# Patient Record
Sex: FEMALE | Race: BLACK OR AFRICAN AMERICAN | HISPANIC OR LATINO | Employment: UNEMPLOYED | URBAN - METROPOLITAN AREA
[De-identification: names, ages, dates, MRNs, and addresses within clinical notes are randomized per-mention and may not be internally consistent; named-entity substitution may affect disease eponyms.]

---

## 2024-11-18 ENCOUNTER — HOSPITAL ENCOUNTER (EMERGENCY)
Facility: HOSPITAL | Age: 39
Discharge: HOME | End: 2024-11-18
Attending: EMERGENCY MEDICINE
Payer: MEDICAID

## 2024-11-18 VITALS
WEIGHT: 128 LBS | OXYGEN SATURATION: 99 % | DIASTOLIC BLOOD PRESSURE: 76 MMHG | HEIGHT: 60 IN | HEART RATE: 100 BPM | BODY MASS INDEX: 25.13 KG/M2 | TEMPERATURE: 98 F | SYSTOLIC BLOOD PRESSURE: 146 MMHG | RESPIRATION RATE: 16 BRPM

## 2024-11-18 DIAGNOSIS — R07.9 CHEST PAIN: ICD-10-CM

## 2024-11-18 DIAGNOSIS — R20.0 LEFT ARM NUMBNESS: Primary | ICD-10-CM

## 2024-11-18 LAB
ALBUMIN SERPL BCP-MCNC: 4.1 G/DL (ref 3.5–5.2)
ALP SERPL-CCNC: 26 U/L (ref 40–150)
ALT SERPL W/O P-5'-P-CCNC: 10 U/L (ref 10–44)
ANION GAP SERPL CALC-SCNC: 8 MMOL/L (ref 8–16)
AST SERPL-CCNC: 27 U/L (ref 10–40)
B-HCG UR QL: NEGATIVE
BASOPHILS # BLD AUTO: 0.05 K/UL (ref 0–0.2)
BASOPHILS NFR BLD: 0.6 % (ref 0–1.9)
BILIRUB SERPL-MCNC: 0.8 MG/DL (ref 0.1–1)
BNP SERPL-MCNC: <10 PG/ML (ref 0–99)
BUN SERPL-MCNC: 14 MG/DL (ref 6–20)
CALCIUM SERPL-MCNC: 9 MG/DL (ref 8.7–10.5)
CHLORIDE SERPL-SCNC: 105 MMOL/L (ref 95–110)
CO2 SERPL-SCNC: 25 MMOL/L (ref 23–29)
CREAT SERPL-MCNC: 0.7 MG/DL (ref 0.5–1.4)
CTP QC/QA: YES
DIFFERENTIAL METHOD BLD: ABNORMAL
EOSINOPHIL # BLD AUTO: 0 K/UL (ref 0–0.5)
EOSINOPHIL NFR BLD: 0.5 % (ref 0–8)
ERYTHROCYTE [DISTWIDTH] IN BLOOD BY AUTOMATED COUNT: 15.2 % (ref 11.5–14.5)
EST. GFR  (NO RACE VARIABLE): >60 ML/MIN/1.73 M^2
GLUCOSE SERPL-MCNC: 107 MG/DL (ref 70–110)
HCT VFR BLD AUTO: 35.2 % (ref 37–48.5)
HGB BLD-MCNC: 11.1 G/DL (ref 12–16)
IMM GRANULOCYTES # BLD AUTO: 0.03 K/UL (ref 0–0.04)
IMM GRANULOCYTES NFR BLD AUTO: 0.3 % (ref 0–0.5)
LYMPHOCYTES # BLD AUTO: 3.3 K/UL (ref 1–4.8)
LYMPHOCYTES NFR BLD: 37.1 % (ref 18–48)
MCH RBC QN AUTO: 25.6 PG (ref 27–31)
MCHC RBC AUTO-ENTMCNC: 31.5 G/DL (ref 32–36)
MCV RBC AUTO: 81 FL (ref 82–98)
MONOCYTES # BLD AUTO: 0.5 K/UL (ref 0.3–1)
MONOCYTES NFR BLD: 5.7 % (ref 4–15)
NEUTROPHILS # BLD AUTO: 4.9 K/UL (ref 1.8–7.7)
NEUTROPHILS NFR BLD: 55.8 % (ref 38–73)
NRBC BLD-RTO: 0 /100 WBC
PLATELET # BLD AUTO: 349 K/UL (ref 150–450)
PMV BLD AUTO: 9.3 FL (ref 9.2–12.9)
POTASSIUM SERPL-SCNC: 3.3 MMOL/L (ref 3.5–5.1)
PROT SERPL-MCNC: 7.9 G/DL (ref 6–8.4)
RBC # BLD AUTO: 4.34 M/UL (ref 4–5.4)
SODIUM SERPL-SCNC: 138 MMOL/L (ref 136–145)
TROPONIN I SERPL DL<=0.01 NG/ML-MCNC: <0.006 NG/ML (ref 0–0.03)
WBC # BLD AUTO: 8.82 K/UL (ref 3.9–12.7)

## 2024-11-18 PROCEDURE — 85025 COMPLETE CBC W/AUTO DIFF WBC: CPT

## 2024-11-18 PROCEDURE — 84484 ASSAY OF TROPONIN QUANT: CPT

## 2024-11-18 PROCEDURE — 93010 ELECTROCARDIOGRAM REPORT: CPT | Mod: ,,, | Performed by: INTERNAL MEDICINE

## 2024-11-18 PROCEDURE — 99285 EMERGENCY DEPT VISIT HI MDM: CPT | Mod: 25

## 2024-11-18 PROCEDURE — 83880 ASSAY OF NATRIURETIC PEPTIDE: CPT

## 2024-11-18 PROCEDURE — 81025 URINE PREGNANCY TEST: CPT | Performed by: NURSE PRACTITIONER

## 2024-11-18 PROCEDURE — 93005 ELECTROCARDIOGRAM TRACING: CPT

## 2024-11-18 PROCEDURE — 25000003 PHARM REV CODE 250: Performed by: NURSE PRACTITIONER

## 2024-11-18 PROCEDURE — 80053 COMPREHEN METABOLIC PANEL: CPT

## 2024-11-18 RX ORDER — NAPROXEN 500 MG/1
500 TABLET ORAL 2 TIMES DAILY WITH MEALS
Qty: 14 TABLET | Refills: 0 | Status: SHIPPED | OUTPATIENT
Start: 2024-11-18

## 2024-11-18 RX ORDER — POTASSIUM CHLORIDE 20 MEQ/1
40 TABLET, EXTENDED RELEASE ORAL
Status: COMPLETED | OUTPATIENT
Start: 2024-11-18 | End: 2024-11-18

## 2024-11-18 RX ADMIN — POTASSIUM CHLORIDE 40 MEQ: 1500 TABLET, EXTENDED RELEASE ORAL at 05:11

## 2024-11-18 NOTE — ED PROVIDER NOTES
Encounter Date: 2024       History     Chief Complaint   Patient presents with    Numbness     Pt reports onset of left atraumatic neck pain x4 days that radiated down to her left shoulder. Pt reports that this morning she began feeling numbness to the distal portion of all her the fingers of her left hand. Reports only numbness no tingling. Paramjit PARTIDA at triage, neuro assessment performed at triage, no neuro deficits noted. Pt also reports intermittent non-radiating chest pain that began approximately 4 days ago with onset of neck pain. Denies dizziness, sob, diaphoresis.      Chief complaint:  Left hand numbness     History of present illness: Patient is a 38-year-old female who reports for proximally 3 months she has had intermittent chest pain that feels like pressure over the left side of her chest.  She reports that usually it comes and goes over a period of 2 days then resolves then recurs.  She endorses the reason she came to the ER today is because she has numbness and tingling in her left hand that is new feature starting yesterday.  Denies nausea and vomiting but endorses shortness of breath.    The history is provided by the patient. A  was used ([id number]).     Review of patient's allergies indicates:  No Known Allergies  History reviewed. No pertinent past medical history.  Past Surgical History:   Procedure Laterality Date     SECTION       Family History   Problem Relation Name Age of Onset    No Known Problems Mother      No Known Problems Father       Social History     Tobacco Use    Smoking status: Never   Substance Use Topics    Alcohol use: Yes    Drug use: No     Review of Systems   Respiratory:  Positive for shortness of breath.    Cardiovascular:  Positive for chest pain.       Physical Exam     Initial Vitals [24 1525]   BP Pulse Resp Temp SpO2   (!) 146/76 100 16 98.1 °F (36.7 °C) 99 %      MAP       --         Physical Exam    Nursing note and vitals  reviewed.  Constitutional: She appears well-developed and well-nourished.   HENT:   Head: Normocephalic and atraumatic.   Right Ear: External ear normal.   Left Ear: External ear normal.   Nose: Nose normal.   Eyes: Conjunctivae and EOM are normal. Pupils are equal, round, and reactive to light. Right eye exhibits no discharge. Left eye exhibits no discharge.   Neck:   Normal range of motion.  Cardiovascular:  Regular rhythm, S1 normal, S2 normal and normal heart sounds.     Exam reveals no gallop.       No murmur heard.  Pulmonary/Chest: Effort normal and breath sounds normal. No respiratory distress. She has no decreased breath sounds. She has no wheezes. She has no rhonchi. She has no rales.   Abdominal: She exhibits no distension.   Musculoskeletal:         General: Normal range of motion.      Cervical back: Normal range of motion.     Neurological: She is alert and oriented to person, place, and time. She has normal strength. No cranial nerve deficit or sensory deficit. She exhibits normal muscle tone. She displays a negative Romberg sign. Coordination and gait normal. GCS eye subscore is 4. GCS verbal subscore is 5. GCS motor subscore is 6.   Equal  strength bilaterally, equal bicep flexion and tricep extension strength, leg extension and flexion strength appropriate and equal, foot plantar- and dorsi-flexion equal and appropriate   Skin: Skin is dry. Capillary refill takes less than 2 seconds.         ED Course   Procedures  Labs Reviewed   CBC W/ AUTO DIFFERENTIAL - Abnormal       Result Value    WBC 8.82      RBC 4.34      Hemoglobin 11.1 (*)     Hematocrit 35.2 (*)     MCV 81 (*)     MCH 25.6 (*)     MCHC 31.5 (*)     RDW 15.2 (*)     Platelets 349      MPV 9.3      Immature Granulocytes 0.3      Gran # (ANC) 4.9      Immature Grans (Abs) 0.03      Lymph # 3.3      Mono # 0.5      Eos # 0.0      Baso # 0.05      nRBC 0      Gran % 55.8      Lymph % 37.1      Mono % 5.7      Eosinophil % 0.5       Basophil % 0.6      Differential Method Automated     COMPREHENSIVE METABOLIC PANEL - Abnormal    Sodium 138      Potassium 3.3 (*)     Chloride 105      CO2 25      Glucose 107      BUN 14      Creatinine 0.7      Calcium 9.0      Total Protein 7.9      Albumin 4.1      Total Bilirubin 0.8      Alkaline Phosphatase 26 (*)     AST 27      ALT 10      eGFR >60      Anion Gap 8     TROPONIN I    Troponin I <0.006     B-TYPE NATRIURETIC PEPTIDE    BNP <10     POCT URINE PREGNANCY    POC Preg Test, Ur Negative       Acceptable Yes          ECG Results              EKG 12-lead (Preliminary result)  Result time 11/18/24 16:33:57      Wet Read by Billy Scott DNP (11/18/24 16:33:57, [facility] Dept, Emergency Medicine)    Independent EKG interpretation of the study dated November 18, 2024 at 15:33 normal sinus rhythm no ectopy no ST elevation MI ventricular rate of 86 QTC interval 399 milliseconds.  Low voltage study.                                  Imaging Results              X-Ray Chest PA And Lateral (Final result)  Result time 11/18/24 17:01:34      Final result by Kobe Betancur MD (11/18/24 17:01:34)                   Impression:      No acute cardiopulmonary process identified.      Electronically signed by: Kobe Betancur MD  Date:    11/18/2024  Time:    17:01               Narrative:    EXAMINATION:  XR CHEST PA AND LATERAL    CLINICAL HISTORY:  Chest Pain;    TECHNIQUE:  PA and lateral views of the chest were performed.    COMPARISON:  None    FINDINGS:  Cardiac silhouette is normal in size.  Lungs are symmetrically expanded.  No evidence of focal consolidative process, pneumothorax, or significant pleural effusion.  No acute osseous abnormality identified.                                       Medications   potassium chloride SA CR tablet 40 mEq (40 mEq Oral Given 11/18/24 1725)     Medical Decision Making  Patient is a 38-year-old female who reports for proximally 3  months she has had intermittent chest pain that feels like pressure over the left side of her chest.  She reports that usually it comes and goes over a period of 2 days then resolves then recurs.  She endorses the reason she came to the ER today is because she has numbness and tingling in her left hand that is new feature starting yesterday.  Denies nausea and vomiting but endorses shortness of breath.    On physical exam the patient is afebrile nontoxic in no apparent distress breath sounds are clear to auscultation heart sounds without abnormality skin warm dry and intact.  Patient has sensation in the left hand as well as the right normal neurologic assessment of the extremities is noted.      Differential diagnosis includes hyperventilation syndrome, influenza COVID-19 paresthesias cervical radiculopathy    Problems Addressed:  Chest pain: acute illness or injury     Details: Given the timing of the chest pain I do not believe it is cardiac in nature nor an emergency.  For that reason patient is discharged to follow up with Cardiology  Left arm numbness: acute illness or injury     Details: Complete sensation noted on exam.  Full strength in both hands likely cervical radiculopathy.  Naprosyn provided for pain.    Amount and/or Complexity of Data Reviewed  Labs: ordered. Decision-making details documented in ED Course.  Radiology:  Decision-making details documented in ED Course.  Discussion of management or test interpretation with external provider(s): Vital signs at the time of disposition were:  BP (!) 146/76 (BP Location: Right arm)   Pulse 100   Temp 98.1 °F (36.7 °C) (Oral)   Resp 16   Ht 5' (1.524 m)   Wt 58.1 kg (128 lb)   LMP 11/14/2024 (Approximate)   SpO2 99%   Breastfeeding No   BMI 25.00 kg/m²       See AVS for additional recommendations. Medications listed herein were prescribed after reviewing the patient's allergies, medication list, history, most recent laboratories as available.   Referrals below were provided after reviewing the patient's previous medical providers. She understands she  should return for any worsening or changes in condition.  Prior to discharge the patient was asked if she  had any additional concerns or complaints and she declined. The patient was given an opportunity to ask questions and all were answered to her satisfaction.     Risk  Prescription drug management.               ED Course as of 11/19/24 1740   Mon Nov 18, 2024   1553 BP(!): 146/76 [VC]   1554 Temp: 98.1 °F (36.7 °C) [VC]   1554 Temp Source: Oral [VC]   1554 Pulse: 100 [VC]   1554 Resp: 16 [VC]   1554 SpO2: 99 % [VC]   1554 hCG Qualitative, Urine: Negative [VC]   1626 CBC auto differential(!)  Mild anemia, normal cbc otherwise. [VC]   1646 Comprehensive metabolic panel(!)  Mild hypokalemia, will replace. [VC]   1656 BNP: <10 [VC]   1656 Troponin I: <0.006 [VC]   1704 X-Ray Chest PA And Lateral    No acute cardiopulmonary process identified.    [VC]      ED Course User Index  [VC] Billy Scott DNP                           Clinical Impression:  Final diagnoses:  [R07.9] Chest pain  [R20.0] Left arm numbness (Primary)          ED Disposition Condition    Discharge Stable          ED Prescriptions       Medication Sig Dispense Start Date End Date Auth. Provider    naproxen (NAPROSYN) 500 MG tablet Take 1 tablet (500 mg total) by mouth 2 (two) times daily with meals. 14 tablet 11/18/2024 -- Billy Scott DNP          Follow-up Information       Follow up With Specialties Details Why Contact [facility] -  Schedule an appointment as soon as possible for a visit   [address] [place] [address]  [phone]               Billy Scott DNP  11/19/24 1740

## 2024-11-18 NOTE — DISCHARGE INSTRUCTIONS
Le ledezma recetado naprosyn (naproxeno sódico), un antiinflamatorio.  [place] thuan medicamento ya sea que sienta que lo necesita o no.  No tome ibuprofeno, naproxeno u otros medicamentos MARICEL mientras ana thuan medicamento. Regrese al Departamento de Emergencias si empeora, [place] winn condición o si surge alguna inquietud.

## 2024-11-21 LAB
OHS QRS DURATION: 70 MS
OHS QTC CALCULATION: 399 MS

## 2025-08-26 ENCOUNTER — HOSPITAL ENCOUNTER (EMERGENCY)
Facility: HOSPITAL | Age: 40
Discharge: HOME | End: 2025-08-26
Attending: EMERGENCY MEDICINE
Payer: MEDICAID